# Patient Record
Sex: FEMALE | Race: BLACK OR AFRICAN AMERICAN | Employment: FULL TIME | URBAN - METROPOLITAN AREA
[De-identification: names, ages, dates, MRNs, and addresses within clinical notes are randomized per-mention and may not be internally consistent; named-entity substitution may affect disease eponyms.]

---

## 2018-12-31 ENCOUNTER — APPOINTMENT (OUTPATIENT)
Dept: GENERAL RADIOLOGY | Age: 28
End: 2018-12-31
Attending: EMERGENCY MEDICINE
Payer: MEDICAID

## 2018-12-31 ENCOUNTER — HOSPITAL ENCOUNTER (EMERGENCY)
Age: 28
Discharge: HOME | End: 2018-12-31
Attending: EMERGENCY MEDICINE
Payer: MEDICAID

## 2018-12-31 VITALS
OXYGEN SATURATION: 100 % | SYSTOLIC BLOOD PRESSURE: 95 MMHG | RESPIRATION RATE: 19 BRPM | HEART RATE: 88 BPM | DIASTOLIC BLOOD PRESSURE: 56 MMHG | TEMPERATURE: 98 F

## 2018-12-31 DIAGNOSIS — R07.9 CHEST PAIN, UNSPECIFIED TYPE: Primary | ICD-10-CM

## 2018-12-31 PROCEDURE — 74011000250 HC RX REV CODE- 250: Performed by: EMERGENCY MEDICINE

## 2018-12-31 PROCEDURE — 74011250637 HC RX REV CODE- 250/637: Performed by: EMERGENCY MEDICINE

## 2018-12-31 PROCEDURE — 71046 X-RAY EXAM CHEST 2 VIEWS: CPT

## 2018-12-31 PROCEDURE — 93005 ELECTROCARDIOGRAM TRACING: CPT

## 2018-12-31 PROCEDURE — 99284 EMERGENCY DEPT VISIT MOD MDM: CPT

## 2018-12-31 PROCEDURE — 71045 X-RAY EXAM CHEST 1 VIEW: CPT

## 2018-12-31 RX ORDER — ACETAMINOPHEN 325 MG/1
650 TABLET ORAL ONCE
Status: COMPLETED | OUTPATIENT
Start: 2018-12-31 | End: 2018-12-31

## 2018-12-31 RX ORDER — LIDOCAINE 4 G/100G
1 PATCH TOPICAL
Status: DISCONTINUED | OUTPATIENT
Start: 2018-12-31 | End: 2019-01-01 | Stop reason: HOSPADM

## 2018-12-31 RX ADMIN — ACETAMINOPHEN 650 MG: 325 TABLET ORAL at 20:13

## 2019-01-01 NOTE — ED PROVIDER NOTES
EMERGENCY DEPARTMENT HISTORY AND PHYSICAL EXAM    7:43 PM    Date: 12/31/2018  Patient Name: Aaron Mario    History of Presenting Illness     Chief Complaint   Patient presents with    Chest Pain     Chief Complaint: Chest pain     History Provided By: Patient    Additional History (Context): Aaron Mario is a 33 y.o. female with PMHx of Hemorrhoids who presents with intermittent left-sided chest pain onset 2 days ago. Notes receiving a 324mg Aspirin from EMS PTA today. Patient reports she first experienced the pain while watching TV. Reports her chest pain is exacerbated with inhalation. Also reports associated symptoms of numbness from her left shoulder down to her toes, and shortness of breath. Denies other symptoms, such as constipation, and difficulty urinating. Notes hx of Sciatica. States this presentation is atypical of her sciatica. Patient reports this presentation intermittently occurs monthly. She states this presentation is not triggered by her menstrual cycle but when she is on her period, her left-sided pain is exacerbated. Denies PMHx of DM but reports FMHx of DM. Patient denies caffeine use. Notes occasional marijuana use. Patient also reports left arm pain near the site of her Nexplanon insertion. Reports it was inserted at [facility]. No other concerns were expressed at this time. PCP: None        Duration:  2 days   Timing:  Intermittent  Location: Left-side of chest   Quality: N/A  Severity: Patient did not dictate   Modifying Factors: Reports her chest pain is exacerbated with inhalation. Notes receiving a 324mg Aspirin from EMS PTA. Associated Symptoms: Also reports associated symptoms of numbness from her left shoulder down to her toes, and shortness of breath. Denies other symptoms, such as constipation, and difficulty urinating. Also reports left arm pain.      Past History     Past Medical History:  Past Medical History:   Diagnosis Date    Hemorrhoids     Other ill-defined conditions(799.89)        Past Surgical History:  Past Surgical History:   Procedure Laterality Date    HX HEMORRHOIDECTOMY  2010       Family History:  Family History   Problem Relation Age of Onset    Heart Disease Mother     Diabetes Father        Social History:  Social History     Tobacco Use    Smoking status: Never Smoker    Smokeless tobacco: Never Used   Substance Use Topics    Alcohol use: No    Drug use: No       Allergies:  No Known Allergies      Review of Systems     Review of Systems   Respiratory: Positive for shortness of breath. Cardiovascular: Positive for chest pain. Gastrointestinal: Negative for constipation. Genitourinary: Negative for difficulty urinating. Musculoskeletal: Positive for myalgias (left arm pain ). Neurological: Positive for numbness. All other systems reviewed and are negative. Physical Exam     Patient Vitals for the past 12 hrs:   Temp Pulse Resp BP SpO2   12/31/18 1933 -- 79 17 -- 100 %   12/31/18 1932 98 °F (36.7 °C) 73 16 113/61 100 %       Physical Exam   Constitutional: She is oriented to person, place, and time. She appears well-developed. HENT:   Head: Normocephalic and atraumatic. Eyes: Conjunctivae and EOM are normal.   Neck: Normal range of motion. Cardiovascular: Normal heart sounds. Exam reveals no gallop and no friction rub. No murmur heard. Pulmonary/Chest: Effort normal and breath sounds normal. No stridor. Reproducible left anterior chest pain   Abdominal: Soft. There is no tenderness. Musculoskeletal: Normal range of motion. She exhibits no tenderness. Neurological: She is alert and oriented to person, place, and time. Skin: Skin is warm and dry. She is not diaphoretic. Psychiatric: She has a normal mood and affect. Her behavior is normal.   Nursing note and vitals reviewed. Diagnostic Study Results     Labs -  No results found for this or any previous visit (from the past 12 hour(s)).     Radiologic Studies -   XR CHEST PA LAT    (Results Pending)         Medical Decision Making     ED Course: Progress Notes, Reevaluation, and Consults:      Provider Notes (Medical Decision Making): Based on my history, physical exam, and diagnostic evaluation, the patient appears to have symptoms consistent with chest pain. The physical exam was unremarkable. I do not believe that the patient's symptoms are related to myocardial ischemia. Ekg does not demonstrate acute ischemic changes and the pt has a low HEART score of 0. I do not believe this is a vascular catastrophe such as a dissection or an acute rupture of an AAA. I also do not believe the pt is having acute thrombo-embolic phenomena. Pt will be discharged to follow-up with their primary care physician in the next 24-48 hours. Patient was advised of our evaluation and instructed to return to the emergency department if symptoms change, worsen, or new symptoms develop. Patient was also instructed to follow-up with her OB/GYN at [facility] to re-evaluate her contraceptive. After a shared decision making discussion where we discussed the above, the patient is agreeable with a trial of outpatient management and had all questions answered. Current Facility-Administered Medications   Medication Dose Route Frequency Provider Last Rate Last Dose    lidocaine 4 % patch 1 Patch  1 Patch TransDERmal NOW Betzaida Camacho MD        acetaminophen (TYLENOL) tablet 650 mg  650 mg Oral Mimi Howell MD         Current Outpatient Medications   Medication Sig Dispense Refill    oxyCODONE-acetaminophen (PERCOCET) 5-325 mg per tablet Take 1 Tab by mouth every four (4) hours as needed for Pain. 20 Tab 0    oxyCODONE-acetaminophen (PERCOCET) 5-325 mg per tablet Take 1 Tab by mouth every four (4) hours as needed for Pain. 45 Tab 0    ferrous sulfate (IRON) 325 mg (65 mg iron) tablet Take  by mouth Daily (before breakfast).       hydrocortisone (ANUSOL-HC) 2.5 % rectal cream Insert  into rectum three (3) times daily. 30 g 0        Vital Signs-Reviewed the patient's vital signs. Pulse Oximetry Analysis -  99% on room air (Interpretation)  Cardiac Monitor:  Rate: 82bpm  Rhythm:  Normal Sinus Rhythm   EKG: Interpreted by the EP. Time Interpreted: 7:28PM   Rate: 73bpm   Rhythm: Normal Sinus Rhythm    Interpretation: Normal STs; QTc is 403    Records Reviewed: Nursing Notes and Triage notes  (Time of Review: 7:43 PM)  -I am the first provider for this patient.  -I reviewed the vital signs, available nursing notes, past medical history, past surgical history, family history and social history. Diagnosis     Clinical Impression:   1. Chest pain, unspecified type        Disposition: Discharged     Follow-up Information     Follow up With Specialties Details Why [address] [place] [address]  Schedule an appointment as soon as possible for a visit  Nathaly Joseph 3  [address]  [phone]    Your OB/GYN at [place] [address] an appointment as soon as possible for a visit To re-evaluate your contraceptives                Medication List      ASK your doctor about these medications    hydrocortisone 2.5 % rectal cream  Commonly known as:  ANUSOL-HC  Insert  into rectum three (3) times daily. Iron 325 mg (65 mg iron) tablet  Generic drug:  ferrous sulfate     * oxyCODONE-acetaminophen 5-325 mg per tablet  Commonly known as:  PERCOCET  Take 1 Tab by mouth every four (4) hours as needed for Pain. * oxyCODONE-acetaminophen 5-325 mg per tablet  Commonly known as:  PERCOCET  Take 1 Tab by mouth every four (4) hours as needed for Pain. * This list has 2 medication(s) that are the same as other medications prescribed for you.  Read the directions carefully, and ask your doctor or other care provider to review them with you.              _______________________________    Attestations:  Scribe [id number] [id number] acting as a scribe for and in the presence of Kevin Bernard MD      December 31, 2018 at 7:43 PM       Provider Attestation:      I personally performed the services described in the documentation, reviewed the documentation, as recorded by the scribe in my presence, and it accurately and completely records my words and actions.  December 31, 2018 at 7:43 PM - Kevin Bernard MD    _________________  ______________

## 2019-01-01 NOTE — DISCHARGE INSTRUCTIONS
Your evaluation today showed chest pain. Please follow up with a doctor as discussed. The evaluation and treatment done today requires that you follow up with a physician for re-evaluation. Not following up could result in chronic pain/disability/injury. Medical problems can change over time and symptoms can get worse or new symptoms can develop over time, therefore, it is important that you follow up as we discussed or return immedediately to the ER. Diseases don't read textbooks and there are limitations to our evaluation and testing, so please immediately return to the ER if you have any concerns. Call the ER if you have any questions about what we discussed. Please visit [employer] for discounts on any prescriptions you may have. [facility] we are genuinely concerned about your health and comfort. You may be selected to participate in a patient satisfaction survey mailed to your home. We are excited about the opportunity to learn from your experience so we may continue to improve. In striving for the very best we believe good is not good enough, but if you rate us as EXCELLENT in all boxes, we have succeeded. We strive to provide EXCELLENT care to you and your family. We appreciate the opportunity to take care of you, and hope you do well. Chest Pain: Care Instructions  Your Care Instructions    There are many things that can cause chest pain. Some are not serious and will get better on their own in a few days. But some kinds of chest pain need more testing and treatment. Your doctor may have recommended a follow-up visit in the next 8 to 12 hours. If you are not getting better, you may need more tests or treatment. Even though your doctor has released you, you still need to watch for any problems. The doctor carefully checked you, but sometimes problems can develop later.  If you have new symptoms or if your symptoms do not get better, get medical care right away.  If you have worse or different chest pain or pressure that lasts more than 5 minutes or you passed out (lost consciousness), call 911 or seek other emergency help right away. A medical visit is only one step in your treatment. Even if you feel better, you still need to do what your doctor recommends, such as going to all suggested follow-up appointments and taking medicines exactly as directed. This will help you recover and help prevent future problems. How can you care for yourself at home? · Rest until you feel better. · Take your medicine exactly as prescribed. Call your doctor if you think you are having a problem with your medicine. · Do not drive after taking a prescription pain medicine. When should you call for help? Call 911 if:    · You passed out (lost consciousness).     · You have severe difficulty breathing.     · You have symptoms of a heart attack. These may include:  ? Chest pain or pressure, or a strange feeling in your chest.  ? Sweating. ? Shortness of breath. ? Nausea or vomiting. ? Pain, pressure, or a strange feeling in your back, neck, jaw, or upper belly or in one or both shoulders or arms. ? Lightheadedness or sudden weakness. ? A fast or irregular heartbeat. After you call 911, the  may tell you to chew 1 adult-strength or 2 to 4 low-dose aspirin. Wait for an ambulance. Do not try to drive yourself.    Call your doctor today if:    · You have any trouble breathing.     · Your chest pain gets worse.     · You are dizzy or lightheaded, or you feel like you may faint.     · You are not getting better as expected.     · You are having new or different chest pain. Where can you learn more? Go to http://alan-vern.info/. Enter A120 in the search box to learn more about \"Chest Pain: Care Instructions. \"  Current as of: November 20, 2017  Content Version: 11.8  © 5531-3652 [employer].  Care instructions adapted under license by [employer] (which disclaims liability or warranty for this information). If you have questions about a medical condition or this instruction, always ask your healthcare professional. [address] any warranty or liability for your use of this information.

## 2019-01-01 NOTE — ED TRIAGE NOTES
Pt arrived via EMS, pt c/o multiple weeks of intermittent chest pain, states she started having left arm pain and numbness which has radiated down to her left leg, denies other symptoms, pt also c/o intermittent SOB with the chest pain, pt appears comfortable, smells of marijuana, states she does use marijuana \"off and on\" pt states she takes no medication has family hx of mother dying from [address] in her 45s.

## 2019-01-02 LAB
ATRIAL RATE: 73 BPM
CALCULATED P AXIS, ECG09: 44 DEGREES
CALCULATED R AXIS, ECG10: 26 DEGREES
CALCULATED T AXIS, ECG11: 24 DEGREES
DIAGNOSIS, 93000: NORMAL
P-R INTERVAL, ECG05: 136 MS
Q-T INTERVAL, ECG07: 366 MS
QRS DURATION, ECG06: 90 MS
QTC CALCULATION (BEZET), ECG08: 403 MS
VENTRICULAR RATE, ECG03: 73 BPM

## 2019-07-31 ENCOUNTER — APPOINTMENT (OUTPATIENT)
Dept: GENERAL RADIOLOGY | Age: 29
End: 2019-07-31
Attending: PHYSICIAN ASSISTANT
Payer: COMMERCIAL

## 2019-07-31 ENCOUNTER — HOSPITAL ENCOUNTER (EMERGENCY)
Age: 29
Discharge: HOME | End: 2019-07-31
Attending: EMERGENCY MEDICINE
Payer: COMMERCIAL

## 2019-07-31 VITALS
WEIGHT: 120 LBS | SYSTOLIC BLOOD PRESSURE: 110 MMHG | DIASTOLIC BLOOD PRESSURE: 74 MMHG | HEART RATE: 82 BPM | BODY MASS INDEX: 21.26 KG/M2 | HEIGHT: 63 IN | TEMPERATURE: 98.1 F | RESPIRATION RATE: 18 BRPM | OXYGEN SATURATION: 99 %

## 2019-07-31 DIAGNOSIS — R20.0 NUMBNESS: ICD-10-CM

## 2019-07-31 DIAGNOSIS — R07.89 ATYPICAL CHEST PAIN: Primary | ICD-10-CM

## 2019-07-31 DIAGNOSIS — M54.12 CERVICAL RADICULOPATHY: ICD-10-CM

## 2019-07-31 LAB
ANION GAP SERPL CALC-SCNC: 4 MMOL/L (ref 3–18)
ATRIAL RATE: 80 BPM
BASOPHILS # BLD: 0 K/UL (ref 0–0.1)
BASOPHILS NFR BLD: 0 % (ref 0–2)
BUN SERPL-MCNC: 11 MG/DL (ref 7–18)
BUN/CREAT SERPL: 16 (ref 12–20)
CALCIUM SERPL-MCNC: 8.6 MG/DL (ref 8.5–10.1)
CALCULATED P AXIS, ECG09: 44 DEGREES
CALCULATED R AXIS, ECG10: 40 DEGREES
CALCULATED T AXIS, ECG11: 19 DEGREES
CHLORIDE SERPL-SCNC: 111 MMOL/L (ref 100–111)
CK MB CFR SERPL CALC: NORMAL % (ref 0–4)
CK MB SERPL-MCNC: <1 NG/ML (ref 5–25)
CK SERPL-CCNC: 100 U/L (ref 26–192)
CO2 SERPL-SCNC: 28 MMOL/L (ref 21–32)
CREAT SERPL-MCNC: 0.69 MG/DL (ref 0.6–1.3)
DIAGNOSIS, 93000: NORMAL
DIFFERENTIAL METHOD BLD: ABNORMAL
EOSINOPHIL # BLD: 0.3 K/UL (ref 0–0.4)
EOSINOPHIL NFR BLD: 4 % (ref 0–5)
ERYTHROCYTE [DISTWIDTH] IN BLOOD BY AUTOMATED COUNT: 12.2 % (ref 11.6–14.5)
GLUCOSE SERPL-MCNC: 118 MG/DL (ref 74–99)
HCT VFR BLD AUTO: 34.1 % (ref 35–45)
HGB BLD-MCNC: 11.6 G/DL (ref 12–16)
LYMPHOCYTES # BLD: 3.2 K/UL (ref 0.9–3.6)
LYMPHOCYTES NFR BLD: 54 % (ref 21–52)
MCH RBC QN AUTO: 31.6 PG (ref 24–34)
MCHC RBC AUTO-ENTMCNC: 34 G/DL (ref 31–37)
MCV RBC AUTO: 92.9 FL (ref 74–97)
MONOCYTES # BLD: 0.5 K/UL (ref 0.05–1.2)
MONOCYTES NFR BLD: 8 % (ref 3–10)
NEUTS SEG # BLD: 2 K/UL (ref 1.8–8)
NEUTS SEG NFR BLD: 34 % (ref 40–73)
P-R INTERVAL, ECG05: 98 MS
PLATELET # BLD AUTO: 272 K/UL (ref 135–420)
PMV BLD AUTO: 9 FL (ref 9.2–11.8)
POTASSIUM SERPL-SCNC: 3.7 MMOL/L (ref 3.5–5.5)
Q-T INTERVAL, ECG07: 346 MS
QRS DURATION, ECG06: 86 MS
QTC CALCULATION (BEZET), ECG08: 399 MS
RBC # BLD AUTO: 3.67 M/UL (ref 4.2–5.3)
SODIUM SERPL-SCNC: 143 MMOL/L (ref 136–145)
TROPONIN I SERPL-MCNC: <0.02 NG/ML (ref 0–0.04)
VENTRICULAR RATE, ECG03: 80 BPM
WBC # BLD AUTO: 5.9 K/UL (ref 4.6–13.2)

## 2019-07-31 PROCEDURE — 82550 ASSAY OF CK (CPK): CPT

## 2019-07-31 PROCEDURE — 99284 EMERGENCY DEPT VISIT MOD MDM: CPT

## 2019-07-31 PROCEDURE — 93005 ELECTROCARDIOGRAM TRACING: CPT

## 2019-07-31 PROCEDURE — 85025 COMPLETE CBC W/AUTO DIFF WBC: CPT

## 2019-07-31 PROCEDURE — 74011000250 HC RX REV CODE- 250: Performed by: EMERGENCY MEDICINE

## 2019-07-31 PROCEDURE — 71046 X-RAY EXAM CHEST 2 VIEWS: CPT

## 2019-07-31 PROCEDURE — 80048 BASIC METABOLIC PNL TOTAL CA: CPT

## 2019-07-31 RX ORDER — LIDOCAINE 4 G/100G
1 PATCH TOPICAL
Status: DISCONTINUED | OUTPATIENT
Start: 2019-07-31 | End: 2019-07-31 | Stop reason: HOSPADM

## 2019-07-31 RX ORDER — NAPROXEN 500 MG/1
500 TABLET ORAL 2 TIMES DAILY WITH MEALS
Qty: 20 TAB | Refills: 0 | Status: SHIPPED | OUTPATIENT
Start: 2019-07-31 | End: 2019-08-10

## 2019-07-31 RX ORDER — LIDOCAINE 50 MG/G
PATCH TOPICAL
Qty: 1 PACKAGE | Refills: 0 | Status: SHIPPED | OUTPATIENT
Start: 2019-07-31

## 2019-07-31 NOTE — LETTER
[address] [place] [address] [address] [facility] EMERGENCY DEPT 
[address] [facility] [address] [phone] Work/School Note Date: 7/31/2019 To Whom It May concern: 
 
Geronimo Garcia was seen and treated today in the emergency room by the following provider(s): 
Attending Provider: Radha Cruz MD 
Physician Assistant: Francia Salazar PA-C. Geronimo Garcia may return to work on 8/1/2019. Sincerely, Breana Baez RN

## 2019-07-31 NOTE — ED PROVIDER NOTES
EMERGENCY DEPARTMENT HISTORY AND PHYSICAL EXAM    1:15 AM      Date: 7/31/2019  Patient Name: Nelson Mcneill    History of Presenting Illness     Chief Complaint   Patient presents with    Chest Pain    Numbness         History Provided By: Patient    Chief Complaint: chest pain, numbness       Additional History (Context): Nelson Mcneill is a 33 y.o. female with No significant past medical history who presents with left-sided numbness of her entire body as well as chest pain on the left radiating to her left arm for over 24 hours. She states that the numbness goes from her neck down to her toes, only on the left side and feels like a tingling pain. The chest pain is 8 out of 10, sharp and intermittent. She denies any cough or shortness of breath. No recent trauma. She denies smoking, hormones, recent travel, immobilization, recent surgery, history of blood clots, malignancy, hemoptysis, leg swelling. She denies headache, dizziness, lightheadedness. She denies history of similar symptoms. Elias Rodriguez PCP: None    Current Facility-Administered Medications   Medication Dose Route Frequency Provider Last Rate Last Dose    lidocaine 4 % patch 1 Patch  1 Patch TransDERmal NOW Arlyn Jose MD   1 Patch at 07/31/19 6558     Current Outpatient Medications   Medication Sig Dispense Refill    naproxen (NAPROSYN) 500 mg tablet Take 1 Tab by mouth two (2) times daily (with meals) for 10 days. 20 Tab 0    lidocaine (LIDODERM) 5 % Apply patch to the affected area for 12 hours a day and remove for 12 hours a day. 1 Package 0    lidocaine HCl-me-salicyl-menth 5-41-48 % ktcg 1 Patch by Apply Externally route daily as needed. 1 Box 0    ferrous sulfate (IRON) 325 mg (65 mg iron) tablet Take  by mouth Daily (before breakfast).  hydrocortisone (ANUSOL-HC) 2.5 % rectal cream Insert  into rectum three (3) times daily.  30 g 0       Past History     Past Medical History:  Past Medical History:   Diagnosis Date    Hemorrhoids  Other ill-defined conditions(799.89)        Past Surgical History:  Past Surgical History:   Procedure Laterality Date    HX HEMORRHOIDECTOMY  2010       Family History:  Family History   Problem Relation Age of Onset    Heart Disease Mother     Diabetes Father        Social History:  Social History     Tobacco Use    Smoking status: Never Smoker    Smokeless tobacco: Never Used   Substance Use Topics    Alcohol use: No    Drug use: No       Allergies:  No Known Allergies      Review of Systems       Review of Systems   Constitutional: Negative for fever. HENT: Negative for facial swelling. Eyes: Negative for visual disturbance. Respiratory: Negative for shortness of breath. Cardiovascular: Positive for chest pain. Gastrointestinal: Negative for abdominal pain. Genitourinary: Negative for dysuria. Musculoskeletal: Negative for neck pain. Skin: Negative for rash. Neurological: Positive for numbness. Negative for dizziness. Psychiatric/Behavioral: Negative for confusion. All other systems reviewed and are negative. Physical Exam     Visit Vitals  /74 (BP 1 Location: Left arm, BP Patient Position: At rest)   Pulse 82   Temp 98.1 °F (36.7 °C)   Resp 18   Ht 5' 3\" (1.6 m)   Wt 54.4 kg (120 lb)   LMP 06/30/2019   SpO2 99%   BMI 21.26 kg/m²         Physical Exam   Constitutional: She is oriented to person, place, and time. She appears well-developed and well-nourished. No distress. HENT:   Head: Normocephalic and atraumatic. Eyes: Conjunctivae are normal.   Neck: Normal range of motion. Cardiovascular: Normal rate, regular rhythm and normal heart sounds. Pulmonary/Chest: Effort normal and breath sounds normal. She has no wheezes. She has no rales. Abdominal: She exhibits no distension. Musculoskeletal: Normal range of motion. Neurological: She is alert and oriented to person, place, and time. She displays normal reflexes. No cranial nerve deficit.  She exhibits normal muscle tone. Coordination normal.   Mild decrease in strength to left upper and lower extremity. CN intact. Oriented x 3. Skin: Skin is warm and dry. She is not diaphoretic. Psychiatric: She has a normal mood and affect. Nursing note and vitals reviewed. Diagnostic Study Results     Labs -  Recent Results (from the past 12 hour(s))   EKG, 12 LEAD, INITIAL    Collection Time: 07/31/19 12:07 AM   Result Value Ref Range    Ventricular Rate 80 BPM    Atrial Rate 80 BPM    P-R Interval 98 ms    QRS Duration 86 ms    Q-T Interval 346 ms    QTC Calculation (Bezet) 399 ms    Calculated P Axis 44 degrees    Calculated R Axis 40 degrees    Calculated T Axis 19 degrees    Diagnosis Sinus rhythm with short [place]  Otherwise normal ECG      CBC WITH AUTOMATED DIFF    Collection Time: 07/31/19  1:30 AM   Result Value Ref Range    WBC 5.9 4.6 - 13.2 K/uL    RBC 3.67 (L) 4.20 - 5.30 M/uL    HGB 11.6 (L) 12.0 - 16.0 g/dL    HCT 34.1 (L) 35.0 - 45.0 %    MCV 92.9 74.0 - 97.0 FL    MCH 31.6 24.0 - 34.0 PG    MCHC 34.0 31.0 - 37.0 g/dL    RDW 12.2 11.6 - 14.5 %    PLATELET 180 781 - 099 K/uL    MPV 9.0 (L) 9.2 - 11.8 FL    NEUTROPHILS 34 (L) 40 - 73 %    LYMPHOCYTES 54 (H) 21 - 52 %    MONOCYTES 8 3 - 10 %    EOSINOPHILS 4 0 - 5 %    BASOPHILS 0 0 - 2 %    ABS. NEUTROPHILS 2.0 1.8 - 8.0 K/UL    ABS. LYMPHOCYTES 3.2 0.9 - 3.6 K/UL    ABS. MONOCYTES 0.5 0.05 - 1.2 K/UL    ABS. EOSINOPHILS 0.3 0.0 - 0.4 K/UL    ABS.  BASOPHILS 0.0 0.0 - 0.1 K/UL    DF AUTOMATED     METABOLIC PANEL, BASIC    Collection Time: 07/31/19  1:30 AM   Result Value Ref Range    Sodium 143 136 - 145 mmol/L    Potassium 3.7 3.5 - 5.5 mmol/L    Chloride 111 100 - 111 mmol/L    CO2 28 21 - 32 mmol/L    Anion gap 4 3.0 - 18 mmol/L    Glucose 118 (H) 74 - 99 mg/dL    BUN 11 7.0 - 18 MG/DL    Creatinine 0.69 0.6 - 1.3 MG/DL    BUN/Creatinine ratio 16 12 - 20      GFR est AA >60 >60 ml/min/1.73m2    GFR est non-AA >60 >60 ml/min/1.73m2    Calcium 8.6 8.5 - 10.1 MG/DL   CARDIAC PANEL,(CK, CKMB & TROPONIN)    Collection Time: 07/31/19  1:30 AM   Result Value Ref Range     26 - 192 U/L    CK - MB <1.0 <3.6 ng/ml    CK-MB Index  0.0 - 4.0 %     CALCULATION NOT PERFORMED WHEN RESULT IS BELOW LINEAR LIMIT    Troponin-I, QT <0.02 0.0 - 0.045 NG/ML       Radiologic Studies -   XR CHEST PA LAT    (Results Pending)         Medical Decision Making   I am the first provider for this patient. I reviewed the vital signs, available nursing notes, past medical history, past surgical history, family history and social history. Vital Signs-Reviewed the patient's vital signs. Records Reviewed: Nursing Notes (Time of Review: 1:15 AM)    ED Course: Progress Notes, Reevaluation, and Consults:      Provider Notes (Medical Decision Making): MDM  Number of Diagnoses or Management Options  Atypical chest pain:   Cervical radiculopathy:   Numbness:   Diagnosis management comments: 29yo F with left sided numbness and painful tingling, as well as left sided chest pain. Healthy F with no RF. PERC negative, low suspicion for PE. No RF for ACS. Vitals normal.      2:34 AM  Labs normal.  EKG NSR. Posible cervical radiculopathy causing symptoms? Primary pain is in upper arm and neck. No other acute abnormalities. Sensation intact. Discussed treatment plan, return precautions, symptomatic relief, and expected time to improvement. All questions answered. Patient is stable for discharge and outpatient management. Diagnosis     Clinical Impression:   1. Atypical chest pain    2. Numbness    3.  Cervical radiculopathy        Disposition: discharge       Follow-up Information     Follow up With Specialties Details Why [address]  Call today For urgent follow up ASAP [address]  [address]    [facility] Emergency Medicine  Immediately if symptoms worsen [address] [place] [address]  [phone]           Patient's Medications   Start Taking    LIDOCAINE (LIDODERM) 5 %    Apply patch to the affected area for 12 hours a day and remove for 12 hours a day. NAPROXEN (NAPROSYN) 500 MG TABLET    Take 1 Tab by mouth two (2) times daily (with meals) for 10 days. Continue Taking    FERROUS SULFATE (IRON) 325 MG (65 MG IRON) TABLET    Take  by mouth Daily (before breakfast). HYDROCORTISONE (ANUSOL-HC) 2.5 % RECTAL CREAM    Insert  into rectum three (3) times daily. LIDOCAINE HCL-ME-SALICYL-MENTH 6-02-25 % KTCG    1 Patch by Apply Externally route daily as needed. These Medications have changed    No medications on file   Stop Taking    No medications on file     _______________________________    Attestations:  Lloyd Berumen PA-C acting as a scribe for and in the presence of MAGNUS Edwards      July 31, 2019 at 2:48 AM       Provider Attestation:      I personally performed the services described in the documentation, reviewed the documentation, as recorded by the scribe in my presence, and it accurately and completely records my words and actions.  July 31, 2019 at 2:48 AM - MAGNUS Edwards  _______________________________

## 2019-07-31 NOTE — DISCHARGE INSTRUCTIONS
Learning About Relief for Back Pain  What is back tension and strain? Back strain happens when you overstretch, or pull, a muscle in your back. You may hurt your back in an accident or when you exercise or lift something. Most back pain will get better with rest and time. You can take care of yourself at home to help your back heal.  What can you do first to relieve back pain? When you first feel back pain, try these steps:  · Walk. Take a short walk (10 to 20 minutes) on a level surface (no slopes, hills, or stairs) every 2 to 3 hours. Walk only distances you can manage without pain, especially leg pain. · Relax. Find a comfortable position for rest. Some people are comfortable on the floor or a medium-firm bed with a small pillow under their head and another under their knees. Some people prefer to lie on their side with a pillow between their knees. Don't stay in one position for too long. · Try heat or ice. Try using a heating pad on a low or medium setting, or take a warm shower, for 15 to 20 minutes every 2 to 3 hours. Or you can buy single-use heat wraps that last up to 8 hours. You can also try an ice pack for 10 to 15 minutes every 2 to 3 hours. You can use an ice pack or a bag of frozen vegetables wrapped in a thin towel. There is not strong evidence that either heat or ice will help, but you can try them to see if they help. You may also want to try switching between heat and cold. · Take pain medicine exactly as directed. ? If the doctor gave you a prescription medicine for pain, take it as prescribed. ? If you are not taking a prescription pain medicine, ask your doctor if you can take an over-the-counter medicine. What else can you do? · Stretch and exercise. Exercises that increase flexibility may relieve your pain and make it easier for your muscles to keep your spine in a good, neutral position. And don't forget to keep walking. · Do self-massage.  You can use self-massage to unwind after work or school or to energize yourself in the morning. You can easily massage your feet, hands, or neck. Self-massage works best if you are in comfortable clothes and are sitting or lying in a comfortable position. Use oil or lotion to massage bare skin. · Reduce stress. Back pain can lead to a vicious [place]: Distress about the pain tenses the muscles in your back, which in turn causes more pain. Learn how to relax your mind and your muscles to lower your stress. Where can you learn more? Go to http://alan-vern.info/. Enter P063 in the search box to learn more about \"Learning About Relief for Back Pain. \"  Current as of: September 20, 2018  Content Version: 12.1  © 6021-2834 [employer]. Care instructions adapted under license by [employer] (which disclaims liability or warranty for this information). If you have questions about a medical condition or this instruction, always ask your healthcare professional. [place] [address] any warranty or liability for your use of this information. Learning About Relief for Back Pain  What is back tension and strain? Back strain happens when you overstretch, or pull, a muscle in your back. You may hurt your back in an accident or when you exercise or lift something. Most back pain will get better with rest and time. You can take care of yourself at home to help your back heal.  What can you do first to relieve back pain? When you first feel back pain, try these steps:  · Walk. Take a short walk (10 to 20 minutes) on a level surface (no slopes, hills, or stairs) every 2 to 3 hours. Walk only distances you can manage without pain, especially leg pain. · Relax. Find a comfortable position for rest. Some people are comfortable on the floor or a medium-firm bed with a small pillow under their head and another under their knees.  Some people prefer to lie on their side with a pillow between their knees. Don't stay in one position for too long. · Try heat or ice. Try using a heating pad on a low or medium setting, or take a warm shower, for 15 to 20 minutes every 2 to 3 hours. Or you can buy single-use heat wraps that last up to 8 hours. You can also try an ice pack for 10 to 15 minutes every 2 to 3 hours. You can use an ice pack or a bag of frozen vegetables wrapped in a thin towel. There is not strong evidence that either heat or ice will help, but you can try them to see if they help. You may also want to try switching between heat and cold. · Take pain medicine exactly as directed. ? If the doctor gave you a prescription medicine for pain, take it as prescribed. ? If you are not taking a prescription pain medicine, ask your doctor if you can take an over-the-counter medicine. What else can you do? · Stretch and exercise. Exercises that increase flexibility may relieve your pain and make it easier for your muscles to keep your spine in a good, neutral position. And don't forget to keep walking. · Do self-massage. You can use self-massage to unwind after work or school or to energize yourself in the morning. You can easily massage your feet, hands, or neck. Self-massage works best if you are in comfortable clothes and are sitting or lying in a comfortable position. Use oil or lotion to massage bare skin. · Reduce stress. Back pain can lead to a vicious [place]: Distress about the pain tenses the muscles in your back, which in turn causes more pain. Learn how to relax your mind and your muscles to lower your stress. Where can you learn more? Go to http://alan-vern.info/. Enter E066 in the search box to learn more about \"Learning About Relief for Back Pain. \"  Current as of: September 20, 2018  Content Version: 12.1  © 0063-1974 [employer].  Care instructions adapted under license by [employer] (which disclaims liability or warranty for this information). If you have questions about a medical condition or this instruction, always ask your healthcare professional. [address] any warranty or liability for your use of this information. Patient Education        Pinched Nerve in the Neck: Care Instructions  Your Care Instructions  A pinched nerve in the neck happens when a vertebra or disc in the upper part of your spine is damaged. This damage can happen because of an injury. Or it can just happen with age. The changes caused by the damage may put pressure on a nearby nerve root, pinching it. This causes symptoms such as sharp pain in your neck, shoulder, arm, hand, or back. You may also have tingling or numbness. Sometimes it makes your arm weaker. The symptoms are usually worse when you turn your head or strain your neck. For many people, the symptoms get better over time and finally go away. Early treatment usually includes medicines for pain and swelling. Sometimes physical therapy and special exercises may help. Follow-up care is a key part of your treatment and safety. Be sure to make and go to all appointments, and call your doctor if you are having problems. It's also a good idea to know your test results and keep a list of the medicines you take. How can you care for yourself at home? · Be safe with medicines. Read and follow all instructions on the label. ¨ If the doctor gave you a prescription medicine for pain, take it as prescribed. ¨ If you are not taking a prescription pain medicine, ask your doctor if you can take an over-the-counter medicine. · Try using a heating pad on a low or medium setting for 15 to 20 minutes every 2 or 3 hours. Try a warm shower in place of one session with the heating pad. You can also buy single-use heat wraps that last up to 8 hours. · You can also try an ice pack for 10 to 15 minutes every 2 to 3 hours. There isn't strong evidence that either heat or ice will help.  But you can try them to see if they help you. · Don't spend too long in one position. Take short breaks to move around and change positions. · Wear a seat belt and shoulder harness when you are in a car. · Sleep with a pillow under your head and neck that keeps your neck straight. · If you were given a neck brace (cervical collar) to limit neck motion, wear it as instructed for as many days as your doctor tells you to. Do not wear it longer than you were told to. Wearing a brace for too long can lead to neck stiffness and can weaken the neck muscles. · Follow your doctor's instructions for gentle neck-stretching exercises. · Do not smoke. Smoking can slow healing of your discs. If you need help quitting, talk to your doctor about stop-smoking programs and medicines. These can increase your chances of quitting for good. · Avoid strenuous work or exercise until your doctor says it is okay. When should you call for help? Call 911 anytime you think you may need emergency care. For example, call if:  ? · You are unable to move an arm or a leg at all. ?Call your doctor now or seek immediate medical care if:  ? · You have new or worse symptoms in your arms, legs, chest, belly, or buttocks. Symptoms may include:  ¨ Numbness or tingling. ¨ Weakness. ¨ Pain. ? · You lose bladder or bowel control. ? Watch closely for changes in your health, and be sure to contact your doctor if:  ? · You are not getting better as expected. Where can you learn more? Go to http://alan-vren.info/. Enter L450 in the search box to learn more about \"Pinched Nerve in the Neck: Care Instructions. \"  Current as of: March 21, 2017  Content Version: 11.5  © 6542-5345 [employer]. Care instructions adapted under license by [employer] (which disclaims liability or warranty for this information).  If you have questions about a medical condition or this instruction, always ask your healthcare professional. "[employer] [facility] disclaims any warranty or liability for your use of this information. Patient Education        Chest Pain: Care Instructions  Your Care Instructions    There are many things that can cause chest pain. Some are not serious and will get better on their own in a few days. But some kinds of chest pain need more testing and treatment. Your doctor may have recommended a follow-up visit in the next 8 to 12 hours. If you are not getting better, you may need more tests or treatment. Even though your doctor has released you, you still need to watch for any problems. The doctor carefully checked you, but sometimes problems can develop later. If you have new symptoms or if your symptoms do not get better, get medical care right away. If you have worse or different chest pain or pressure that lasts more than 5 minutes or you passed out (lost consciousness), call 911 or seek other emergency help right away. A medical visit is only one step in your treatment. Even if you feel better, you still need to do what your doctor recommends, such as going to all suggested follow-up appointments and taking medicines exactly as directed. This will help you recover and help prevent future problems. How can you care for yourself at home? · Rest until you feel better. · Take your medicine exactly as prescribed. Call your doctor if you think you are having a problem with your medicine. · Do not drive after taking a prescription pain medicine. When should you call for help? Call 911 if:    · You passed out (lost consciousness).     · You have severe difficulty breathing.     · You have symptoms of a heart attack. These may include:  ? Chest pain or pressure, or a strange feeling in your chest.  ? Sweating. ? Shortness of breath. ? Nausea or vomiting. ? Pain, pressure, or a strange feeling in your back, neck, jaw, or upper belly or in one or both shoulders or arms.   ? Lightheadedness or sudden weakness. ? A fast or irregular heartbeat. After you call 911, the  may tell you to chew 1 adult-strength or 2 to 4 low-dose aspirin. Wait for an ambulance. Do not try to drive yourself.    Call your doctor today if:    · You have any trouble breathing.     · Your chest pain gets worse.     · You are dizzy or lightheaded, or you feel like you may faint.     · You are not getting better as expected.     · You are having new or different chest pain. Where can you learn more? Go to http://alan-vern.info/. Enter A120 in the search box to learn more about \"Chest Pain: Care Instructions. \"  Current as of: September 23, 2018  Content Version: 12.1  © 5002-4036 Healthwise[employer] Incorporated. Care instructions adapted under license by [employer] (which disclaims liability or warranty for this information). If you have questions about a medical condition or this instruction, always ask your healthcare professional. [place] [address] any warranty or liability for your use of this information.

## 2019-07-31 NOTE — ED TRIAGE NOTES
Patient complaining of left side chest pain and left sided numbness x Monday night.  Patient denies SOB, abdominal pain, N/V.

## 2020-04-25 ENCOUNTER — HOSPITAL ENCOUNTER (EMERGENCY)
Age: 30
Discharge: HOME | End: 2020-04-25
Attending: EMERGENCY MEDICINE
Payer: COMMERCIAL

## 2020-04-25 ENCOUNTER — APPOINTMENT (OUTPATIENT)
Dept: GENERAL RADIOLOGY | Age: 30
End: 2020-04-25
Attending: EMERGENCY MEDICINE
Payer: COMMERCIAL

## 2020-04-25 VITALS
HEART RATE: 89 BPM | TEMPERATURE: 97.5 F | WEIGHT: 137 LBS | OXYGEN SATURATION: 98 % | BODY MASS INDEX: 24.27 KG/M2 | RESPIRATION RATE: 16 BRPM | DIASTOLIC BLOOD PRESSURE: 77 MMHG | SYSTOLIC BLOOD PRESSURE: 124 MMHG

## 2020-04-25 DIAGNOSIS — M65.4 TENDINITIS, DE QUERVAIN'S: Primary | ICD-10-CM

## 2020-04-25 PROCEDURE — 99282 EMERGENCY DEPT VISIT SF MDM: CPT

## 2020-04-25 PROCEDURE — 73110 X-RAY EXAM OF WRIST: CPT

## 2020-04-25 RX ORDER — NAPROXEN 500 MG/1
500 TABLET ORAL
Qty: 20 TAB | Refills: 0 | Status: SHIPPED | OUTPATIENT
Start: 2020-04-25 | End: 2020-05-05

## 2020-04-25 NOTE — DISCHARGE INSTRUCTIONS
Patient Education        Bhavin Graves Disease: Exercises  Introduction  Here are some examples of exercises for you to try. The exercises may be suggested for a condition or for rehabilitation. Start each exercise slowly. Ease off the exercises if you start to have pain. You will be told when to start these exercises and which ones will work best for you. How to do the exercises  Thumb lifts   1. Place your hand on a flat surface, with your palm up. 2. Lift your thumb away from your palm to make a \"C\" shape. 3. Hold for about 6 seconds. 4. Repeat 8 to 12 times. Passive thumb MP flexion   1. Hold your hand in front of you, and turn your hand so your little finger faces down and your thumb faces up. (Your hand should be in the position used for shaking someone's hand.) You may also rest your hand on a flat surface. 2. Use the fingers on your other hand to bend your thumb down at the point where your thumb connects to your palm. 3. Hold for at least 15 to 30 seconds. 4. Repeat 2 to 4 times. Finkelstein stretch   1. Hold your arms out in front of you. (Your hand should be in the position used for shaking someone's hand.)  2. Bend your thumb toward your palm. 3. Use your other hand to gently stretch your thumb and wrist downward until you feel the stretch on the thumb side of your wrist.  4. Hold for at least 15 to 30 seconds. 5. Repeat 2 to 4 times. Resisted ulnar deviation   1. Sit leaning forward with your legs slightly spread and your elbow on your thigh. 2. Grasp one end of the band with your palm down, and step on the other end with the foot opposite the hand holding the band. 3. Slowly bend your wrist sideways and away from your knee. 4. Repeat 8 to 12 times. Follow-up care is a key part of your treatment and safety. Be sure to make and go to all appointments, and call your doctor if you are having problems.  It's also a good idea to know your test results and keep a list of the medicines you take. Where can you learn more? Go to http://alan-vern.info/  Enter Q998 in the search box to learn more about \"De Quervain's Disease: Exercises. \"  Current as of: June 26, 2019Content Version: 12.4  © 7593-4962 Healthwise, Incorporated. Care instructions adapted under license by [employer] (which disclaims liability or warranty for this information). If you have questions about a medical condition or this instruction, always ask your healthcare professional. [address] any warranty or liability for your use of this information. Patient Education        Grazyna Cook Tenosynovitis: Care Instructions  Your Care Instructions  Grazyna Cook (say \"[place]\") tenosynovitis is a problem that makes the bottom of your thumb and the side of your wrist hurt. When you have de Quervain's, the ropey fiber (tendon) that helps move your thumb away from your fingers becomes swollen. You may have pain when you move your wrist or pick things up. You may hear a creaking sound when you move your wrist or thumb. Symptoms often get better in a few weeks with home care. Your doctor may want you to start some gentle stretching exercises once your symptoms are gone. Sometimes treatment with an injection or surgery is needed. Follow-up care is a key part of your treatment and safety. Be sure to make and go to all appointments, and call your doctor if you are having problems. It's also a good idea to know your test results and keep a list of the medicines you take. How can you care for yourself at home? · Until your symptoms are better, stop the activities that caused the pain. · Avoid moving the hand and wrist that hurt. · Follow your doctor's directions for wearing a splint to keep your thumb and wrist from moving. · Try ice or heat. ? Put ice or a cold pack on your thumb and wrist for 10 to 20 minutes at a time.  Put a thin cloth between the ice and your skin.  ? You can use heat for 20 to 30 minutes, 2 or 3 times a day. Try using a heating pad, hot shower, or hot pack. · Ask your doctor if you can take an over-the-counter pain medicine, such as acetaminophen (Tylenol), ibuprofen (Advil, Motrin), or naproxen (Aleve). Be safe with medicines. Read and follow all instructions on the label. When should you call for help? Watch closely for changes in your health, and be sure to contact your doctor if:    · You have new or worse pain.     · You have new or worse numbness or tingling in your hand or fingers.     · Your hand feels weaker.     · You do not get better as expected. Where can you learn more? Go to http://alan-vern.info/  Enter [id number] in the search box to learn more about \"De Quervain's Tenosynovitis: Care Instructions. \"  Current as of: June 26, 2019Content Version: 12.4  © 6721-0745 [employer]. Care instructions adapted under license by [employer] (which disclaims liability or warranty for this information). If you have questions about a medical condition or this instruction, always ask your healthcare professional. [place] [address] any warranty or liability for your use of this information. Patient Education        Domenic Sham Tenosynovitis: Care Instructions  Your Care Instructions  Myna Sham (say \"grw-flmv-ASM\") tenosynovitis is a problem that makes the bottom of your thumb and the side of your wrist hurt. When you have de Quervain's, the ropey fiber (tendon) that helps move your thumb away from your fingers becomes swollen. You may have pain when you move your wrist or pick things up. You may hear a creaking sound when you move your wrist or thumb. Symptoms often get better in a few weeks with home care. Your doctor may want you to start some gentle stretching exercises once your symptoms are gone. Sometimes treatment with an injection or surgery is needed.   Follow-up care is a key part of your treatment and safety. Be sure to make and go to all appointments, and call your doctor if you are having problems. It's also a good idea to know your test results and keep a list of the medicines you take. How can you care for yourself at home? · Until your symptoms are better, stop the activities that caused the pain. · Avoid moving the hand and wrist that hurt. · Follow your doctor's directions for wearing a splint to keep your thumb and wrist from moving. · Try ice or heat. ? Put ice or a cold pack on your thumb and wrist for 10 to 20 minutes at a time. Put a thin cloth between the ice and your skin. ? You can use heat for 20 to 30 minutes, 2 or 3 times a day. Try using a heating pad, hot shower, or hot pack. · Ask your doctor if you can take an over-the-counter pain medicine, such as acetaminophen (Tylenol), ibuprofen (Advil, Motrin), or naproxen (Aleve). Be safe with medicines. Read and follow all instructions on the label. When should you call for help? Watch closely for changes in your health, and be sure to contact your doctor if:    · You have new or worse pain.     · You have new or worse numbness or tingling in your hand or fingers.     · Your hand feels weaker.     · You do not get better as expected. Where can you learn more? Go to http://alan-vern.info/  Enter [id number] in the search box to learn more about \"De Quervain's Tenosynovitis: Care Instructions. \"  Current as of: June 26, 2019Content Version: 12.4  © 7108-1006 Healthwise, Inc[employer]. Care instructions adapted under license by [email or web address] (which disclaims liability or warranty for this information). If you have questions about a medical condition or this instruction, always ask your healthcare professional. [address] any warranty or liability for your use of this information.

## 2020-04-25 NOTE — LETTER
NOTIFICATION RETURN TO WORK / SCHOOL 
 
4/25/2020 1:51 AM 
 
Ms. Racheal Jang [facility] [id number] To Whom It May Concern: 
 
Racheal Jang is currently under the care of [facility]. She will return to work/school on: 4/30/2020 If there are questions or concerns please have the patient contact our office.  
 
 
 
Sincerely, 
 
 
TAMARA Curiel

## 2020-04-25 NOTE — ED PROVIDER NOTES
EMERGENCY DEPARTMENT HISTORY AND PHYSICAL EXAM    Date: 4/25/2020  Patient Name: Victoria Kanner    History of Presenting Illness     Chief Complaint   Patient presents with    Wrist Pain         History Provided By: patient      Additional History (Context): Victoria Kanner is a 45-year-old female with no significant past medical history that presents to the ER with right wrist pain in the radial and dorsal aspect since today. No trauma. She does admit to frequent typing and mousing with the right hand and has been working a lot of overtime lately. She denies any pain previously. She denies any swelling or erythema. She has not tried any treatments at home. Pain is worse with movement and palpation. Patient is right-hand dominant. PCP: None    Current Outpatient Medications   Medication Sig Dispense Refill    naproxen (Naprosyn) 500 mg tablet Take 1 Tab by mouth two (2) times daily as needed for Pain for up to 10 days. 20 Tab 0    lidocaine (LIDODERM) 5 % Apply patch to the affected area for 12 hours a day and remove for 12 hours a day. 1 Package 0    lidocaine HCl-me-salicyl-menth 0-71-67 % ktcg 1 Patch by Apply Externally route daily as needed. 1 Box 0    ferrous sulfate (IRON) 325 mg (65 mg iron) tablet Take  by mouth Daily (before breakfast).  hydrocortisone (ANUSOL-HC) 2.5 % rectal cream Insert  into rectum three (3) times daily. 30 g 0       Past History     Past Medical History:  Past Medical History:   Diagnosis Date    Hemorrhoids     Other ill-defined conditions(799.89)        Past Surgical History:  Past Surgical History:   Procedure Laterality Date    HX HEMORRHOIDECTOMY  2010       Family History:  Family History   Problem Relation Age of Onset    Heart Disease Mother     Diabetes Father        Social History:  Social History     Tobacco Use    Smoking status: Never Smoker    Smokeless tobacco: Never Used   Substance Use Topics    Alcohol use: No    Drug use:  No Allergies:  No Known Allergies      Review of Systems       Review of Systems   Constitutional: Negative for chills and fever. HENT: Negative for nasal congestion, sore throat, rhinorrhea  Eyes: Negative. Respiratory: pos cough and negative for shortness of breath. Cardiovascular: Negative for chest pain and palpitations. Gastrointestinal: Negative for abdominal pain, constipation, diarrhea, nausea and vomiting. Genitourinary: Negative. Negative for difficulty urinating and flank pain. Musculoskeletal: Right wrist pain. Negative for back pain. Negative for gait problem and neck pain. Skin: Negative. Allergic/Immunologic: Negative. Neurological: Negative for dizziness, weakness, numbness and headaches. Psychiatric/Behavioral: Negative. All other systems reviewed and are negative. All Other Systems Negative  Physical Exam     Vitals:    04/25/20 0121   BP: 124/77   Pulse: 89   Resp: 16   Temp: 97.5 °F (36.4 °C)   SpO2: 98%   Weight: 62.1 kg (137 lb)     Physical Exam  Vitals signs and nursing note reviewed. Constitutional:       General: She is not in acute distress. Appearance: Normal appearance. She is not ill-appearing, toxic-appearing or diaphoretic. HENT:      Head: Normocephalic and atraumatic. Nose: Nose normal.   Eyes:      General: Lids are normal. Vision grossly intact. Conjunctiva/sclera: Conjunctivae normal.      Pupils: Pupils are equal, round, and reactive to light. Neck:      Musculoskeletal: Full passive range of motion without pain and normal range of motion. Cardiovascular:      Rate and Rhythm: Normal rate and regular rhythm. Pulses: Normal pulses. Heart sounds: Normal heart sounds. No murmur. No friction rub. No gallop. Pulmonary:      Effort: Pulmonary effort is normal. No respiratory distress. Breath sounds: Normal breath sounds. No wheezing or rales.    Musculoskeletal:      Right wrist: She exhibits decreased range of motion and tenderness (To the dorsal/radial aspect. ). She exhibits no swelling, no effusion, no crepitus, no deformity and no laceration. Comments: Positive Finkelstein's test.  Negative anatomic snuffbox tenderness. Skin:     General: Skin is warm and dry. Capillary Refill: Capillary refill takes less than 2 seconds. Neurological:      General: No focal deficit present. Mental Status: She is alert and oriented to person, place, and time. Psychiatric:         Mood and Affect: Mood normal.         Behavior: Behavior normal. Behavior is cooperative. Diagnostic Study Results     Labs -   No results found for this or any previous visit (from the past 12 hour(s)). Radiologic Studies -   XR WRIST RT AP/LAT/OBL MIN 3V    (Results Pending)     CT Results  (Last 48 hours)    None        CXR Results  (Last 48 hours)    None            Medical Decision Making   I am the first provider for this patient. I reviewed the vital signs, available nursing notes, past medical history, past surgical history, family history and social history. Vital Signs-Reviewed the patient's vital signs. Records Reviewed: Nursing notes, old medical records and any previous labs, imaging, visits, consultations pertinent to patient care    Procedures:  Procedures    ED Course: Progress Notes, Reevaluation, and Consults:      Provider Notes (Medical Decision Making):     Differential diagnosis: Tendinitis, sprain/strain, fracture    23-year-old female with no significant past medical history presents to the ER for right wrist pain with known mechanism of injury. A thorough physical exam was completed and appropriate diagnostic studies were ordered. The radiological findings were discussed in detail with the patient. X-ray negative for acute process. No evidence of compartment syndrome as pain is not out of proportion and there is no swelling. Appropriate for outpatient management.   Clinical exam significant for de Quervain's tendinitis. Will discuss supportive treatment, NSAIDS, RICE, immobilization, and orthopedic follow-up. Thumb spica wrist splint applied in the ER. Neurovascularly intact post application. Discussed treatment plan, return precautions, symptomatic relief, and expected time to improvement. All questions answered. Patient is stable for discharge and outpatient management. Medication use, risk/benefit, side effects, and precautions discussed. Patient will follow-up with orthopedics and given strict ER return precautions. MED RECONCILIATION:  No current facility-administered medications for this encounter. Current Outpatient Medications   Medication Sig    naproxen (Naprosyn) 500 mg tablet Take 1 Tab by mouth two (2) times daily as needed for Pain for up to 10 days.  lidocaine (LIDODERM) 5 % Apply patch to the affected area for 12 hours a day and remove for 12 hours a day.  lidocaine HCl-me-salicyl-menth 2-77-11 % ktcg 1 Patch by Apply Externally route daily as needed.  ferrous sulfate (IRON) 325 mg (65 mg iron) tablet Take  by mouth Daily (before breakfast).  hydrocortisone (ANUSOL-HC) 2.5 % rectal cream Insert  into rectum three (3) times daily. Disposition:  Home in stable condition    DISCHARGE NOTE:     Patient has been reexamined. Patient has no new complaints, changes, or physical findings. Care plan outlined and precautions discussed. Discussed proper way to take medications. Discussed treatment plan, return precautions, symptomatic relief, and expected time to improvement. All questions answered. Patient is stable for discharge and outpatient management. Patient is ready to go home.     Follow-up Information     Follow up With Specialties Details Why Lennie Johnson  Schedule an appointment as soon as possible for a visit Follow-up from the Emergency Department [facility] [address]  [phone]    [facility] EMERGENCY DEPT Emergency Medicine  As needed, If symptoms worsen [address] [place] [address]  [phone]          Current Discharge Medication List      START taking these medications    Details   naproxen (Naprosyn) 500 mg tablet Take 1 Tab by mouth two (2) times daily as needed for Pain for up to 10 days. Qty: 20 Tab, Refills: 0         CONTINUE these medications which have NOT CHANGED    Details   lidocaine (LIDODERM) 5 % Apply patch to the affected area for 12 hours a day and remove for 12 hours a day. Qty: 1 Package, Refills: 0      lidocaine HCl-me-salicyl-menth 2-79-98 % ktcg 1 Patch by Apply Externally route daily as needed. Qty: 1 Box, Refills: 0      ferrous sulfate (IRON) 325 mg (65 mg iron) tablet Take  by mouth Daily (before breakfast). hydrocortisone (ANUSOL-HC) 2.5 % rectal cream Insert  into rectum three (3) times daily. Qty: 30 g, Refills: 0                   Diagnosis     Clinical Impression:   1. Tendinitis, long Teixeira's          Dictation disclaimer:  Please note that this dictation was completed with Elite [employer], the computer voice recognition software. Quite often unanticipated grammatical, syntax, homophones, and other interpretive errors are inadvertently transcribed by the computer software. Please disregard these errors. Please excuse any errors that have escaped final proofreading.

## 2021-02-26 ENCOUNTER — TRANSCRIBE ORDER (OUTPATIENT)
Dept: SCHEDULING | Age: 31
End: 2021-02-26

## 2021-02-26 DIAGNOSIS — G35 MULTIPLE SCLEROSIS (HCC): Primary | ICD-10-CM

## 2022-08-23 ENCOUNTER — APPOINTMENT (OUTPATIENT)
Dept: PHYSICAL THERAPY | Age: 32
End: 2022-08-23

## 2022-09-01 ENCOUNTER — HOSPITAL ENCOUNTER (OUTPATIENT)
Dept: PHYSICAL THERAPY | Age: 32
End: 2022-09-01
Payer: COMMERCIAL

## 2022-09-09 ENCOUNTER — HOSPITAL ENCOUNTER (OUTPATIENT)
Dept: PHYSICAL THERAPY | Age: 32
Discharge: HOME | End: 2022-09-09
Payer: COMMERCIAL

## 2022-09-09 PROCEDURE — 97161 PT EVAL LOW COMPLEX 20 MIN: CPT

## 2022-09-09 PROCEDURE — 97140 MANUAL THERAPY 1/> REGIONS: CPT

## 2022-09-09 PROCEDURE — 97112 NEUROMUSCULAR REEDUCATION: CPT

## 2022-09-09 PROCEDURE — 97530 THERAPEUTIC ACTIVITIES: CPT

## 2022-09-09 PROCEDURE — 97110 THERAPEUTIC EXERCISES: CPT

## 2022-09-09 NOTE — PROGRESS NOTES
PT DAILY TREATMENT NOTE/ LUMBAR EVAL 10-18    Patient Name: Alfredito Reddy  Date:2022  : 1990  [x]  Patient  Verified  Payor: Darene Lundborg / Plan: Alexx Mike PPO / Product Type: PPO /    In time:8:16 am  Out time:9:00 AM  Total Treatment Time (min): 44  Visit #: 1 of 16    Medicare/BCBS Only   Total Timed Codes (min):  34 1:1 Treatment Time:  N/A       Treatment Area: Other low back pain [M54.59]    SUBJECTIVE  Pain Level (0-10 scale): 3/10  [x]constant []intermittent []improving [x]worsening []no change since onset    Any medication changes, allergies to medications, adverse drug reactions, diagnosis change, or new procedure performed?: [x] No    [] Yes (see summary sheet for update)  Subjective functional status/changes:     Pt reports onset of back pain intermittently years ago. States it has been getting worse. States she was told she has sciatica. States she is unsure what provokes her back pain without specific pattern though she notes intercourse does tend to bother it. States she will get tingling in her left arm and leg through the entirety of both as well as her left lower back. Reports she was told she had a pinched nerve in her neck, had an MRI Monday but has not heard results. States tylenol or muscle relaxers reduces but does not abolish her back pain. States tingling in her back at the moment. States she had EMG/NCS which is what indicated she had a pinched nerve in her neck. States she has also had some weakness on the left side. PLOF: functionally independent, no AD, inactive lifestyle  Limitations to PLOF: difficulty bending down to all fours to  objects, bending, sexual intercourse  Mechanism of Injury: insidious onset  Current symptoms/Complaints: 0/10 at best; 10/10 at worst; pt reports they are able to sleep through the night secondary to pain  Previous Treatment/Compliance: primary care, neurology, tylenol and muscle relaxers PRN.   Imaging  PMHx/Surgical Hx: none per pt other than depression  Work Hx: Sits a lot at work, inactive otherwise  Living Situation:   Pt Goals: \"I want to strengthen my left side and my leg. \"  Barriers: []pain []financial []time []transportation []other  Motivation: appears motivated and cooperative  Substance use: []Alcohol []Tobacco []other:   Cognition: A & O x 3    OBJECTIVE    10 min [x]Eval                  []Re-Eval       8 min Therapeutic Exercise:  [x] See flow sheet : HEP instruction per handout   Rationale: increase ROM to improve the patients ability to restore PLOF      10 min Therapeutic Activity:  []  See flow sheet :   Rationale:  pt education regarding relevant anatomy, diagnosis, prognosis, plan of care, activity modification, self modality use   to improve the patients ability to facilitate self management and improve outcomes. 8  min Neuromuscular Re-education:  [x]  See flow sheet : HEP instruction per handout   Rationale: improve coordination and increase proprioception  to improve the patients ability to improve trunk stability and reduce back pain. 8 min Manual Therapy:  STM left QL/lumbar paraspinals, LLE long axis distraction   Rationale: decrease pain, increase ROM, and increase tissue extensibility to reduce back pain and improve QOL.           With   [] TE   [] TA   [] neuro   [] other: Patient Education: [x] Review HEP    [] Progressed/Changed HEP based on:   [] positioning   [] body mechanics   [] transfers   [] heat/ice application    [] other:        General Evaluation    Physical Therapy Evaluation - Lumbar Spine  :    OBJECTIVE  Posture:  Lateral Shift: [] Right    [] Left     [] +  [] -  Kyphosis: [] Increased [] Decreased   []  WNL  Lordosis:  [] Increased [] Decreased   [] WNL  Pelvic symmetry: [] WNL    [] Other:    Gait:  [] Normal     [] Abnormal:    Active Movements: [] N/A   [] Too acute   [] Other:  ROM % AROM Comments:pain, area   Forward flexion 40-60 Fingers to floor Discomfort in left shoulder Extension 20-30 WNL Increased left LBP at end range   SideBend right 20-30 Fingertips to 10 cm past right fibular head    SideBend left 20-30 Fingertips to left fibular head Left LBP   Rotation right 5-10 70 degrees    Rotation left 5-10 50 degrees Left LBP     Repeated Movements   Effects on present pain: produces (OH), abolishes (A), increases (incr), decreases (decr), centralizes (C), peripheral (PH), no effect (NE)   Pre-Test Sx Flexion Repeated Flexion Extension Repeated Extension Repeated SBL Repeated SBR   Sitting          Standing          Lying      N/A N/A   Comments:  Side Glide:  Sustained passive positioning test:    *no change with repeated flex or ext movement    Neuro Screen [x] WNL  Myotome/Dermatome/Reflexes: Patellar 2+ bilat, Calcaneal 2+ bilat, biceps brachii 2+ bilat, brachioradialis 1+ bilat, triceps 1+ bilat  Comments: (-) babinski, (-) farley's    Dural Mobility:  SLR Supine: [] Right    [] Left   [] +    [x] -  @ (degrees):   Slump Test: [] Right    [] Left   [] +    [x] -  @ (degrees):   Prone Knee Bend: [] Right    [] Left    [] +    [x] -     Palpation  [] Min  [x] Mod  [] Severe    Location: left PSIS  [x] Min  [] Mod  [] Severe    Location: left QL, left gluteals    Strength   Left(0-5) Right (0-5) N/T   Hip Flexion (L1,2) 5 5 []   Knee Extension (L3,4) 5 5 []   Ankle Dorsiflexion (L4) 5 5 []   Great Toe Extension (L5) 5 5 []   Ankle Plantarflexion (S1) 5 5 []   Knee Flexion (S1,2) 4+ 4+ []   Abdominals   []   Paraspinals   []   Back Rotators   []   Gluteus Hardik 4- discomfort left back 4 []   Hip Abduction 3+ 4 []   Hip ER 4/5 bilat  Hip IR 4/5 bilat      Special Tests  Lumbar:  Lumb.  Compression: [] Pos  [x] Neg               Lumbar Distraction:   [x] Pos  [] Neg    Quadrant:  [x] Pos  [] Neg   [] Flex  [x] Ext    S[place]roilliac:  Gaenslen's: [] Right    [x] Left    [x] +    [] -     Compression: [] +    [x] -     Gapping:  [] +    [x] -     Thigh Thrust: [] Right    [x] Left [x] +    [] -     One Finger Test: (+) pain to PSIS per pt on left         Hip: Isauro Prashant:  [x] Right    [x] Left    [] +    [x] -     Scour:  [x] Right    [x] Left    [] +    [x] -     Piriformis: [x] Right    [x] Left    [] +    [x] -       Other Tests / Comments:      Pain with bridge and squat  Advised pt to keep with her follow up with neurology to continue diagnostics regarding dysesthesias in the left Ue and LE    Pain Level (0-10 scale) post treatment: 2/10    ASSESSMENT/Changes in Function: Pt is a 27 yo female who reports onset of back pain intermittently years ago. States it has been getting worse. States she will get tingling in her left arm and leg through the entirety of both as well as her left lower back. Signs and symptoms at present appear possibly consistent with Left SIJ dysfunction with (+) thigh thrust, gaenslyn's tests, TTP left PSIS, lumbar paraspinals, with associated limited trunk rotation ROM, left hip abd and ext weakness. Neurological and cervical screen normal with no clear indications as to source of pts reports of dysesthesias/paresthesias. Patient demonstrates decreased ROM, decreased strength, impaired posture, impaired gait mechancis, pain and decreased functional mobility tolerance. Patient will continue to benefit from skilled PT services to modify and progress therapeutic interventions, address functional mobility deficits, address ROM deficits, address strength deficits, analyze and address soft tissue restrictions, analyze and cue movement patterns, analyze and modify body mechanics/ergonomics, and instruct in home and community integration to attain remaining goals. [x]  See Plan of Care  []  See progress note/recertification  []  See Discharge Summary         Progress towards goals / Updated goals:  Short Term Goals: To be accomplished in 4 weeks:  Patient will be independent and compliant with HEP to progress toward goals and restore functional mobility.    Eval Status: issued at eval    2. Patient will improve pain in lower back and LLE at most to 3/10  to improve daily activity tolerance and restore prior level of function. Eval Status: 10/10 at worst    3. Pt will have painfree trunk rotation AROM to aid in functional mechanics for ambulation/ADLs. Eval Status: 50 degrees left with pain, 70 right    Long Term Goals: To be accomplished in 8 weeks:  Patient will improve FOTO score to 65 points to improve functional tolerance for improve ease of daily task performance. Eval Status: FOTO 51  FOTO score = an established functional score where 100 = no disability    2. Pt will have 4+/5 hip abd and ext strength bilat to return to goals of bending, lifting, and ambulatory tasks with reduced pain. Eval Status:   Gluteus Hardik 4- discomfort left back 4 []   Hip Abduction 3+ 4 []     3. Patient will demonstrate ability to perform squat x 10 reps with 25# voiud of increased pain to improve ease of lifting tasks. Eval Status: pain with bridging and squatting movements. PLAN  [x]  Upgrade activities as tolerated     [x]  Continue plan of care  [x]  Update interventions per flow sheet       []  Discharge due to:_  [x]  Other:_Progress with trunk stability, trunk rotation mobility, flexion/neutral bias, emphasis SIJ stability. Progress with symmetrical to asymmetrical movements as tolerated including loading. Manual PRN.       Gifty Chavez, PT, DPT, ATC 9/9/2022  8:20 AM

## 2022-09-09 NOTE — PROGRESS NOTES
[facility] at the [address], [place], [address] [facility]  Phone: [phone]      Fax:  [phone]      Plan of Care/ Statement of Necessity for Physical Therapy Services      Patient name: Alfredito Reddy Start of Care: 2022   Referral source: Ruthy Long MD : 1990    Medical Diagnosis: Other low back pain [M54.59]   Onset Date:years ago per pt    Treatment Diagnosis: Other low back pain   Prior Hospitalization: see medical history Provider#: [id number]   Medications: Verified on Patient summary List    Comorbidities: Depression   Prior Level of Function: functionally independent, no AD, inactive lifestyle      The Plan of Care and following information is based on the information from the initial evaluation. Assessment/ key information: Pt is a 27 yo female who reports onset of back pain intermittently years ago. States it has been getting worse. States she will get tingling in her left arm and leg through the entirety of both as well as her left lower back. Signs and symptoms at present appear possibly consistent with Left SIJ dysfunction with (+) thigh thrust, gaenslyn's tests, TTP left PSIS, lumbar paraspinals, with associated limited trunk rotation ROM, left hip abd and ext weakness. Neurological and cervical screen normal with no clear indications as to source of pts reports of dysesthesias/paresthesias. Patient demonstrates decreased ROM, decreased strength, impaired posture, impaired gait mechancis, pain and decreased functional mobility tolerance.     Patient will continue to benefit from skilled PT services to modify and progress therapeutic interventions, address functional mobility deficits, address ROM deficits, address strength deficits, analyze and address soft tissue restrictions, analyze and cue movement patterns, analyze and modify body mechanics/ergonomics, and instruct in home and community integration to attain remaining goals. She has been advised to seek follow up for further diagnostics regarding her left sided dysesthesias. Evaluation Complexity History MEDIUM  Complexity : 1-2 comorbidities / personal factors will impact the outcome/ POC ; Examination LOW Complexity : 1-2 Standardized tests and measures addressing body structure, function, activity limitation and / or participation in recreation  ;Presentation MEDIUM Complexity : Evolving with changing characteristics  ; Clinical Decision Making MEDIUM Complexity : FOTO score of 26-74 FOTO score = an established functional score where 100 = no disability  Overall Complexity Rating: LOW   Problem List: pain affecting function, decrease ROM, decrease strength, decrease ADL/ functional abilitiies, decrease activity tolerance, decrease flexibility/ joint mobility, and decrease transfer abilities   Treatment Plan may include any combination of the following: Therapeutic exercise, Therapeutic activities, Neuromuscular re-education, Physical agent/modality, Manual therapy, Patient education, and Self Care training    Patient / Family readiness to learn indicated by: asking questions, trying to perform skills, and interest  Persons(s) to be included in education: patient (P)  Barriers to Learning/Limitations: None  Patient Goal (s): \"I want to strengthen my left side and my leg. \"  Patient Self Reported Health Status: fair  Rehabilitation Potential: good    Short Term Goals: To be accomplished in 4 weeks:  Patient will be independent and compliant with HEP to progress toward goals and restore functional mobility. Eval Status: issued at eval     2. Patient will improve pain in lower back and LLE at most to 3/10  to improve daily activity tolerance and restore prior level of function. Eval Status: 10/10 at worst     3. Pt will have painfree trunk rotation AROM to aid in functional mechanics for ambulation/ADLs.   Eval Status: 50 degrees left with pain, 70 right     Long Term Goals: To be accomplished in 8 weeks:  Patient will improve FOTO score to 65 points to improve functional tolerance for improve ease of daily task performance. Eval Status: FOTO 51  FOTO score = an established functional score where 100 = no disability     2. Pt will have 4+/5 hip abd and ext strength bilat to return to goals of bending, lifting, and ambulatory tasks with reduced pain. Eval Status:   Gluteus Hardik 4- discomfort left back 4 []    Hip Abduction 3+ 4 []       3. Patient will demonstrate ability to perform squat x 10 reps with 25# voiud of increased pain to improve ease of lifting tasks. Eval Status: pain with bridging and squatting movements. Frequency / Duration: Patient to be seen 2 times per week for 8 weeks. Patient/ Caregiver education and instruction: Diagnosis, prognosis, self care, activity modification, exercises, and other self modality use. [x]  Plan of care has been reviewed with PTA    Certification Period: N/A  Ash Larry, PT, DPT, ATC, CMTPT-DN 9/9/2022 11:25 AM  _____________________________________________________________________  I certify that the above Therapy Services are being furnished while the patient is under my care. I agree with the treatment plan and certify that this therapy is necessary.     [de-identified] Signature:____________Date:_________TIME:________                                      Anneliese Acosta MD    ** Signature, Date and Time must be completed for valid certification **    Please sign and return to [facility] at the [address], [facility], [address] [facility]       Phone: [phone]      Fax:  [phone]

## 2022-09-13 ENCOUNTER — HOSPITAL ENCOUNTER (OUTPATIENT)
Dept: PHYSICAL THERAPY | Age: 32
Discharge: HOME | End: 2022-09-13
Payer: COMMERCIAL

## 2022-09-13 PROCEDURE — 97110 THERAPEUTIC EXERCISES: CPT

## 2022-09-13 PROCEDURE — 97530 THERAPEUTIC ACTIVITIES: CPT

## 2022-09-13 PROCEDURE — 97112 NEUROMUSCULAR REEDUCATION: CPT

## 2022-09-13 PROCEDURE — 97140 MANUAL THERAPY 1/> REGIONS: CPT

## 2022-09-13 NOTE — PROGRESS NOTES
PT DAILY TREATMENT NOTE    Patient Name: Wojciech Nath  Date:2022  : 1990  [x]  Patient  Verified  Payor: Jose Reyes / Plan: Ovidio Begum PPO / Product Type: PPO /    In time:7:31 am  Out time:8:27 am  Total Treatment Time (min): 56  Total Timed Codes (min): 56  1:1 Treatment Time (MC/BCBS only): N/A   Visit #: 2 of 16    Treatment Dx: Other low back pain [M54.59]    SUBJECTIVE  Pain Level (0-10 scale): 0  Any medication changes, allergies to medications, adverse drug reactions, diagnosis change, or new procedure performed?: [x] No    [] Yes (see summary sheet for update)  Subjective functional status/changes:   [] No changes reported  Pt reports \"my back actually feels good this morning. I think the exercises are helping\". Pt reports she has been doing her HEP without complaints. OBJECTIVE    12 min Therapeutic Exercise:  [x] See flow sheet :   Rationale: increase ROM and increase strength to improve the patients ability to restore PLOF    8 min Therapeutic Activity:  [x]  See flow sheet :   Rationale: increase strength, improve coordination, and increase proprioception  to improve the patients ability to complete transfers and ADLs without external assist or pain. 26 min Neuromuscular Re-education:  [x]  See flow sheet :   Rationale: increase strength, improve coordination, and increase proprioception  to improve the patients ability to activate core stabilizer musculature without compensation    10 min Manual Therapy:  STM left lumbar paraspinals and QL, LLE long axis distraction   Rationale: decrease pain, increase ROM, and increase tissue extensibility to improve the patients ability to improve ease of daily activity. The manual therapy interventions were performed at a separate and distinct time from the therapeutic activities interventions.           With   [] TE   [] TA   [] neuro   [] other: Patient Education: [x] Review HEP    [] Progressed/Changed HEP based on:   [] positioning   [] body mechanics   [] transfers   [] heat/ice application    [] other:      Other Objective/Functional Measures: cueing to facilitate TA contraction and PPT without valsalva maneuver    Mild TTP left lumbar paraspinals     Pain Level (0-10 scale) post treatment: 0    ASSESSMENT/Changes in Function: Patient tolerated treatment session well today during first follow up appt. Pt demonstrates improved pain, but continues to demonstrate difficulty with trunk stability requiring frequent cueing for TA contraction void of valsalva maneuver. Relief of left sided back discomfort with LLE long axis distraction. Patient continues to make good progress toward goals and would benefit from continued skilled PT intervention to address remaining deficits outlined in goals below. No adverse response to treatment noted, though pt notes fatigue post treatment. Patient will continue to benefit from skilled PT services to modify and progress therapeutic interventions, address functional mobility deficits, address ROM deficits, address strength deficits, analyze and address soft tissue restrictions, analyze and cue movement patterns, analyze and modify body mechanics/ergonomics, assess and modify postural abnormalities, and instruct in home and community integration to attain remaining goals. [x]  See Plan of Care  []  See progress note/recertification  []  See Discharge Summary         Progress towards goals / Updated goals:  Short Term Goals: To be accomplished in 4 weeks:  Patient will be independent and compliant with HEP to progress toward goals and restore functional mobility. Eval Status: issued at [facility]  Current: progressing, pt reports compliance with HEP exercises daily 9/13/22     2. Patient will improve pain in lower back and LLE at most to 3/10  to improve daily activity tolerance and restore prior level of function. Eval Status: 10/10 at worst     3.  Pt will have painfree trunk rotation AROM to aid in functional mechanics for ambulation/ADLs. Eval Status: 50 degrees left with pain, 70 right     Long Term Goals: To be accomplished in 8 weeks:  Patient will improve FOTO score to 65 points to improve functional tolerance for improve ease of daily task performance. Eval Status: FOTO 51  FOTO score = an established functional score where 100 = no disability     2. Pt will have 4+/5 hip abd and ext strength bilat to return to goals of bending, lifting, and ambulatory tasks with reduced pain. Eval Status:   Gluteus Hardik 4- discomfort left back 4 []    Hip Abduction 3+ 4 []       3. Patient will demonstrate ability to perform squat x 10 reps with 25# voiud of increased pain to improve ease of lifting tasks. Eval Status: pain with bridging and squatting movements.       PLAN  []  Upgrade activities as tolerated     [x]  Continue plan of care  []  Update interventions per flow sheet       []  Discharge due to:_  []  Other:_      Gifty Chavez, PT, DPT, ATC, CMTPT-DN 9/13/2022  7:35 AM    Future Appointments   Date Time Provider Milan Ferreira   9/15/2022  7:30 AM Trinity Caraballo PT [facility]   9/20/2022  7:30 AM Deonna DWYERHPKYLEW [facility]   9/22/2022  7:30 AM Trinity Caraballo PT [facility]   9/27/2022  7:30 AM Deonna Fried [facility]PTBW [facility]   9/29/2022  7:30 AM KENDRA McginnisHPTBTEX [facility]   10/4/2022  7:30 AM Deonna Fried [facility]   10/6/2022  7:30 AM Trinity Caraballo PT [facility]   10/11/2022  7:30 AM Deonna DWYERHPTBW [facility]   10/13/2022  7:30 AM Trinity Caraballo PT [facility]   10/18/2022  7:30 AM Deonna DWYERHPMANUEL [facility]   10/20/2022  7:30 AM Trinity Caraballo PT MIHPTBW [facility]

## 2022-09-15 ENCOUNTER — HOSPITAL ENCOUNTER (OUTPATIENT)
Dept: PHYSICAL THERAPY | Age: 32
End: 2022-09-15
Payer: COMMERCIAL

## 2022-09-20 ENCOUNTER — HOSPITAL ENCOUNTER (OUTPATIENT)
Dept: PHYSICAL THERAPY | Age: 32
Discharge: HOME | End: 2022-09-20
Payer: COMMERCIAL

## 2022-09-20 PROCEDURE — 97530 THERAPEUTIC ACTIVITIES: CPT

## 2022-09-20 PROCEDURE — 97112 NEUROMUSCULAR REEDUCATION: CPT

## 2022-09-20 PROCEDURE — 97140 MANUAL THERAPY 1/> REGIONS: CPT

## 2022-09-20 PROCEDURE — 97110 THERAPEUTIC EXERCISES: CPT

## 2022-09-20 NOTE — PROGRESS NOTES
PT DAILY TREATMENT NOTE    Patient Name: Ye Sampson  Date:2022  : 1990  [x]  Patient  Verified  Payor: Yamilet Stacy / Plan: Sanjuanita Degroot PPO / Product Type: PPO /    In time:7:31 am  Out time:8:31 am  Total Treatment Time (min): 60  Total Timed Codes (min): 60  1:1 Treatment Time (MC/BCBS only): NA   Visit #: 3 of 16    Treatment Dx: Other low back pain [M54.59]    SUBJECTIVE  Pain Level (0-10 scale): 0  Any medication changes, allergies to medications, adverse drug reactions, diagnosis change, or new procedure performed?: [x] No    [] Yes (see summary sheet for update)  Subjective functional status/changes:   [] No changes reported  Pt reports she is feeling well this morning noting no pain. States she still will sometimes get some left sided back pain during the day. Notes she was not able to follow up last week as she was sick with a virus. OBJECTIVE    15 min Therapeutic Exercise:  [x] See flow sheet :   Rationale: increase ROM and increase strength to improve the patients ability to improve ease of daily activity and restore PLOF. 10 min Therapeutic Activity:  [x]  See flow sheet :   Rationale: increase strength, improve coordination, and increase proprioception  to improve the patients ability to complete transfers and ADLs & lifting tasks without external assist     25 min Neuromuscular Re-education:  [x]  See flow sheet :   Rationale: increase strength, improve coordination, and increase proprioception  to improve the patients ability to improve trunk stability to facilitate improved ease of loading tasks. 10 min Manual Therapy:  STM left lumbar paraspinals, LLE long axis distraction. Rationale: decrease pain, increase ROM, and increase tissue extensibility to improve the patients ability to improve ease of daily activity. The manual therapy interventions were performed at a separate and distinct time from the therapeutic activities interventions.         With   [] TE   [] TA   [] neuro   [] other: Patient Education: [x] Review HEP    [] Progressed/Changed HEP based on:   [] positioning   [] body mechanics   [] transfers   [] heat/ice application    [] other:      Other Objective/Functional Measures: minimal TTP and tone left lumbar paraspinals, relief with LLE long axis distraction   Cues to facilitate TA without valsalva or global abdominal activation    Pain Level (0-10 scale) post treatment: 0    ASSESSMENT/Changes in Function: Patient tolerated treatment session well today. Pt demonstrates improved pain and improved trunk stability though still requires some initial cueing to facilitate TA contraction void of valsalva or global abdominal activation. Progressed into squatting tasks today void of adverse response requiring cueing for gluteal facilitation and weight shift over base of support. Patient continues to make good progress toward goals and would benefit from continued skilled PT intervention to address remaining deficits outlined in goals below. No adverse response to treatment noted. Patient will continue to benefit from skilled PT services to modify and progress therapeutic interventions, address functional mobility deficits, address ROM deficits, address strength deficits, analyze and address soft tissue restrictions, analyze and cue movement patterns, analyze and modify body mechanics/ergonomics, and instruct in home and community integration to attain remaining goals. [x]  See Plan of Care  []  See progress note/recertification  []  See Discharge Summary         Progress towards goals / Updated goals:  Short Term Goals: To be accomplished in 4 weeks:  Patient will be independent and compliant with HEP to progress toward goals and restore functional mobility. [facility] Status: issued at [facility]  Current: progressing, pt reports compliance with HEP exercises daily 9/20/22     2.  Patient will improve pain in lower back and LLE at most to 3/10  to improve daily activity tolerance and restore prior level of function. Eval Status: 10/10 at worst  Current: 0/10 upon arrival, pt reports grossly improved 9/20/2022     3. Pt will have painfree trunk rotation AROM to aid in functional mechanics for ambulation/ADLs. Eval Status: 50 degrees left with pain, 70 right     Long Term Goals: To be accomplished in 8 weeks:  Patient will improve FOTO score to 65 points to improve functional tolerance for improve ease of daily task performance. Eval Status: FOTO 51  FOTO score = an established functional score where 100 = no disability     2. Pt will have 4+/5 hip abd and ext strength bilat to return to goals of bending, lifting, and ambulatory tasks with reduced pain. Eval Status:   Gluteus Hardik 4- discomfort left back 4 []    Hip Abduction 3+ 4 []       3. Patient will demonstrate ability to perform squat x 10 reps with 25# voiud of increased pain to improve ease of lifting tasks. Eval Status: pain with bridging and squatting movements.   Current: progressed into unloaded TRX squat void of pain 9/20/22      PLAN  []  Upgrade activities as tolerated     [x]  Continue plan of care  []  Update interventions per flow sheet       []  Discharge due to:_  []  Other:_      Linda Mckeon, PT, DPT, ATC, CMTPT-DN 9/20/2022  7:40 AM    Future Appointments   Date Time Provider Milan Ferreira   9/22/2022  7:30 AM Nel Hensley, PT GEN [facility]   9/27/2022  7:30 AM Gino BLEVINS [facility]   9/29/2022  7:30 AM Aura Edu, PT [facility]HPTBTEX [facility]   10/4/2022  7:30 AM Thekalia DWYERHPMANUEL [facility]   10/6/2022  7:30 AM Aura Edu, PT [facility]PTBTEX [facility]   10/11/2022  7:30 AM Gino BLEVINS [facility]   10/13/2022  7:30 AM Aurpanda Hensley, PT [facility]   10/18/2022  7:30 AM Gino BROCKTBTEX [facility]   10/20/2022  7:30 AM Nel Hensley PT [facility]PTBTEX [facility]

## 2022-09-22 ENCOUNTER — HOSPITAL ENCOUNTER (OUTPATIENT)
Dept: PHYSICAL THERAPY | Age: 32
Discharge: HOME | End: 2022-09-22
Payer: COMMERCIAL

## 2022-09-22 PROCEDURE — 97112 NEUROMUSCULAR REEDUCATION: CPT

## 2022-09-22 PROCEDURE — 97530 THERAPEUTIC ACTIVITIES: CPT

## 2022-09-22 PROCEDURE — 97110 THERAPEUTIC EXERCISES: CPT

## 2022-09-22 NOTE — PROGRESS NOTES
PT DAILY TREATMENT NOTE    Patient Name: Alexa Ruggiero  Date:2022  : 1990  [x]  Patient  Verified  Payor: Leidy Costello / Plan: Soha Vital PPO / Product Type: PPO /    In time:7:33  Out time:8:23  Total Treatment Time (min): 50  Total Timed Codes (min): 50  1:1 Treatment Time (MC/BCBS only): 50   Visit #: 4 of 16    Treatment Dx: Other low back pain [M54.59]    SUBJECTIVE  Pain Level (0-10 scale): 0/10  Any medication changes, allergies to medications, adverse drug reactions, diagnosis change, or new procedure performed?: [x] No    [] Yes (see summary sheet for update)  Subjective functional status/changes:   [] No changes reported  Pt reports that she felt fine after last therapy session. Reports that she is compliant with HEP.      OBJECTIVE        10 min Therapeutic Exercise:  [] See flow sheet :   Rationale: increase ROM, increase strength, improve coordination, improve balance, and increase proprioception to improve the patients ability to perform daily activities with decreased pain and symptom levels      9 min Therapeutic Activity:  []  See flow sheet :   Rationale: increase ROM, increase strength, improve coordination, improve balance, and increase proprioception  to improve the patients ability to perform daily activities with decreased pain and symptom levels       23 min Neuromuscular Re-education:  []  See flow sheet :   Rationale: increase ROM, increase strength, improve coordination, improve balance, and increase proprioception  to improve the patients ability to perform daily activities with decreased pain and symptom levels      8 min Manual Therapy: Pt in left sidelying with bolster under left trunk, right LE extended with bolster under leg, MFR superficial to Right QL with right iliac depression, STM to Right QL, manual stretch of left posterior hip   Rationale: decrease pain, increase ROM, increase tissue extensibility, decrease trigger points, and increase postural awareness to improve the patients ability to perform daily activities with decreased pain and symptom levels    The manual therapy interventions were performed at a separate and distinct time from the therapeutic activities interventions. With   [] TE   [] TA   [] neuro   [] other: Patient Education: [x] Review HEP    [] Progressed/Changed HEP based on:   [] positioning   [] body mechanics   [] transfers   [] heat/ice application    [] other:      Other Objective/Functional Measures: Pt enters gym in no apparent distress. LTR: (inches) right:16.5 left: 16       Pain Level (0-10 scale) post treatment: 0    ASSESSMENT/Changes in Function: Patient tolerated therapy session well as there were no adverse reactions today. Pt had a difficulty time keeping pelvic neutral and spinal neutrality with performing QP leg extension. Pt with a tendency to shrug shoulders in the quadriped position. Pt requires verbal and tactile cuing with PPT. Placed white circular weight between glutes and heels to help engage hamstring with PPT exercise. Pt with a tendency to hold breath and required frequent tactile cuing to breath and to try to engage abs to pull ribs down and in. Pt is progressing with therapy as indicated by pt tolerating increase in exercise repetitions and resistance. Although showing progress patient would benefit from continuation of skilled physical therapy to address the remaining limitations. Patient will continue to benefit from skilled PT services to modify and progress therapeutic interventions, address functional mobility deficits, address ROM deficits, address strength deficits, analyze and address soft tissue restrictions, analyze and cue movement patterns, analyze and modify body mechanics/ergonomics, assess and modify postural abnormalities, and instruct in home and community integration to attain remaining goals.      []  See Plan of Care  []  See progress note/recertification  []  See Discharge Summary Progress towards goals / Updated goals:  Short Term Goals: To be accomplished in 4 weeks:  Patient will be independent and compliant with HEP to progress toward goals and restore functional mobility. Eval Status: issued at eval  Current: 9/22/22 reports compliance     2. Patient will improve pain in lower back and LLE at most to 3/10  to improve daily activity tolerance and restore prior level of function. Eval Status: 10/10 at worst  Current: 9/22/22 rates pain 0/10 this therapy session     3. Pt will have painfree trunk rotation AROM to aid in functional mechanics for ambulation/ADLs. Eval Status: 50 degrees left with pain, 70 right  Current: 9/22/22 LTR: (inches) right:16.5 left: 16     Long Term Goals: To be accomplished in 8 weeks:  Patient will improve FOTO score to 65 points to improve functional tolerance for improve ease of daily task performance. Eval Status: FOTO 51  Current: 9/22/22 will perform on 5th visit, next visit  FOTO score = an established functional score where 100 = no disability     2. Pt will have 4+/5 hip abd and ext strength bilat to return to goals of bending, lifting, and ambulatory tasks with reduced pain. Eval Status:   Gluteus Hardik 4- discomfort left back 4 []    Hip Abduction 3+ 4 []     Current: 9/22/22 pt required frequent cuing to keep PPT with bridges    3. Patient will demonstrate ability to perform squat x 10 reps with 25# voiud of increased pain to improve ease of lifting tasks. Eval Status: pain with bridging and squatting movements.   Current: 9/22/22 not formally assessed         PLAN  []  Upgrade activities as tolerated     [x]  Continue plan of care  []  Update interventions per flow sheet       []  Discharge due to:_  []  Other:_      Isamar Monday, PT, DPT, CIMT  9/22/2022  7:31 AM    Future Appointments   Date Time Provider Milan Ferreira   9/27/2022  7:30 AM Lauri Hammans MIHPTBW [facility]   9/29/2022  7:30 AM Sussy Tam PT BRITTNEYW [facility]   10/4/2022 7:30 AM Colleen Patrick [facility]   10/6/2022  7:30 AM Eleanor Sanders, PT [facility]   10/11/2022  7:30 AM Colleen Patrick [facility]PTBW [facility]   10/13/2022  7:30 AM Eleanor Sanders, PT [facility]   10/18/2022  7:30 AM Colleen Patrick [facility]PT[facility]   10/20/2022  7:30 AM Eleanor Sanders, PT [facility]PT[facility]

## 2022-10-04 ENCOUNTER — TELEPHONE (OUTPATIENT)
Dept: PHYSICAL THERAPY | Age: 32
End: 2022-10-04

## 2022-10-06 ENCOUNTER — TELEPHONE (OUTPATIENT)
Dept: PHYSICAL THERAPY | Age: 32
End: 2022-10-06

## 2022-10-11 ENCOUNTER — APPOINTMENT (OUTPATIENT)
Dept: PHYSICAL THERAPY | Age: 32
End: 2022-10-11

## 2022-10-13 ENCOUNTER — APPOINTMENT (OUTPATIENT)
Dept: PHYSICAL THERAPY | Age: 32
End: 2022-10-13

## 2022-10-18 ENCOUNTER — APPOINTMENT (OUTPATIENT)
Dept: PHYSICAL THERAPY | Age: 32
End: 2022-10-18

## 2022-10-20 ENCOUNTER — APPOINTMENT (OUTPATIENT)
Dept: PHYSICAL THERAPY | Age: 32
End: 2022-10-20

## 2022-10-25 NOTE — PROGRESS NOTES
[facility]  [address], [place], [address]  Tel. [phone]  Fax: [phone]    Physical Therapy Discharge Summary    Patient Name: Alexa Short : 1990   Treatment/Medical Diagnosis: Other low back pain [M54.59]   Referral Source: Birdie Calabrese MD     Date of Initial Visit: 22 Attended Visits: 4 Missed Visits: 5       SUMMARY OF TREATMENT  Pt attended only initial evaluation and     3     follow-ups with the last attended visit on 22 and then did not return. Therefore a formal reassessment of goals was not performed. RECOMMENDATIONS  Discontinue physical therapy due to patient not returning. Pt shall remain under care of MD.      If you have any questions/comments please contact us directly at [phone]. Thank you for allowing us to assist in the care of your patient.     Therapist Signature: Mei Kenney, PT, DPT, CIMT Date: 10/25/22     Time: 10:56 AM